# Patient Record
Sex: MALE | Race: WHITE | NOT HISPANIC OR LATINO | Employment: FULL TIME | URBAN - METROPOLITAN AREA
[De-identification: names, ages, dates, MRNs, and addresses within clinical notes are randomized per-mention and may not be internally consistent; named-entity substitution may affect disease eponyms.]

---

## 2017-03-21 ENCOUNTER — OFFICE VISIT - HEALTHEAST (OUTPATIENT)
Dept: FAMILY MEDICINE | Facility: CLINIC | Age: 37
End: 2017-03-21

## 2017-03-21 DIAGNOSIS — B35.1 ONYCHOMYCOSIS: ICD-10-CM

## 2017-03-21 ASSESSMENT — MIFFLIN-ST. JEOR: SCORE: 2024.83

## 2017-04-17 ENCOUNTER — OFFICE VISIT - HEALTHEAST (OUTPATIENT)
Dept: FAMILY MEDICINE | Facility: CLINIC | Age: 37
End: 2017-04-17

## 2017-04-17 DIAGNOSIS — R42 LIGHTHEADEDNESS: ICD-10-CM

## 2017-04-17 DIAGNOSIS — G56.20 ULNAR NEUROPATHY: ICD-10-CM

## 2017-04-17 DIAGNOSIS — M54.2 NECK PAIN: ICD-10-CM

## 2017-04-17 LAB — HBA1C MFR BLD: 5.7 % (ref 3.5–6)

## 2017-04-17 ASSESSMENT — MIFFLIN-ST. JEOR: SCORE: 2020.29

## 2017-04-25 ENCOUNTER — HOSPITAL ENCOUNTER (OUTPATIENT)
Dept: MRI IMAGING | Facility: HOSPITAL | Age: 37
Discharge: HOME | End: 2017-04-25
Attending: FAMILY MEDICINE

## 2017-04-25 DIAGNOSIS — G56.20 ULNAR NEUROPATHY: ICD-10-CM

## 2017-04-25 DIAGNOSIS — M54.2 NECK PAIN: ICD-10-CM

## 2017-05-01 ENCOUNTER — COMMUNICATION - HEALTHEAST (OUTPATIENT)
Dept: FAMILY MEDICINE | Facility: CLINIC | Age: 37
End: 2017-05-01

## 2017-05-01 DIAGNOSIS — M50.30 BULGE OF CERVICAL DISC WITHOUT MYELOPATHY: ICD-10-CM

## 2017-05-01 DIAGNOSIS — M54.2 NECK PAIN: ICD-10-CM

## 2017-05-08 ENCOUNTER — HOSPITAL ENCOUNTER (OUTPATIENT)
Dept: PHYSICAL MEDICINE AND REHAB | Facility: CLINIC | Age: 37
Discharge: HOME | End: 2017-05-08
Attending: FAMILY MEDICINE

## 2017-05-08 DIAGNOSIS — G56.20 ULNAR NEUROPATHY: ICD-10-CM

## 2017-05-08 DIAGNOSIS — M54.12 CERVICAL RADICULITIS: ICD-10-CM

## 2017-05-08 DIAGNOSIS — M50.20 CERVICAL DISC HERNIATION: ICD-10-CM

## 2017-05-12 ENCOUNTER — OFFICE VISIT - HEALTHEAST (OUTPATIENT)
Dept: PHYSICAL THERAPY | Facility: CLINIC | Age: 37
End: 2017-05-12

## 2017-05-12 DIAGNOSIS — M54.2 NECK PAIN: ICD-10-CM

## 2017-05-12 DIAGNOSIS — G56.21 ULNAR NEUROPATHY OF RIGHT UPPER EXTREMITY: ICD-10-CM

## 2017-05-23 ENCOUNTER — OFFICE VISIT - HEALTHEAST (OUTPATIENT)
Dept: PHYSICAL THERAPY | Facility: CLINIC | Age: 37
End: 2017-05-23

## 2017-05-23 DIAGNOSIS — G56.21 ULNAR NEUROPATHY OF RIGHT UPPER EXTREMITY: ICD-10-CM

## 2017-05-23 DIAGNOSIS — M54.2 NECK PAIN: ICD-10-CM

## 2017-06-05 ENCOUNTER — HOSPITAL ENCOUNTER (OUTPATIENT)
Dept: PHYSICAL MEDICINE AND REHAB | Facility: CLINIC | Age: 37
Discharge: HOME | End: 2017-06-05
Attending: PHYSICIAN ASSISTANT

## 2017-06-05 DIAGNOSIS — G56.20 ULNAR NEUROPATHY: ICD-10-CM

## 2017-06-05 DIAGNOSIS — M50.20 CERVICAL DISC HERNIATION: ICD-10-CM

## 2017-06-05 DIAGNOSIS — M54.12 CERVICAL RADICULITIS: ICD-10-CM

## 2018-02-12 ENCOUNTER — OFFICE VISIT - HEALTHEAST (OUTPATIENT)
Dept: FAMILY MEDICINE | Facility: CLINIC | Age: 38
End: 2018-02-12

## 2018-02-12 ENCOUNTER — RECORDS - HEALTHEAST (OUTPATIENT)
Dept: GENERAL RADIOLOGY | Facility: CLINIC | Age: 38
End: 2018-02-12

## 2018-02-12 DIAGNOSIS — M79.641 PAIN IN RIGHT HAND: ICD-10-CM

## 2018-02-12 DIAGNOSIS — M79.641 RIGHT HAND PAIN: ICD-10-CM

## 2018-02-19 ENCOUNTER — RECORDS - HEALTHEAST (OUTPATIENT)
Dept: ADMINISTRATIVE | Facility: OTHER | Age: 38
End: 2018-02-19

## 2018-02-26 ENCOUNTER — RECORDS - HEALTHEAST (OUTPATIENT)
Dept: ADMINISTRATIVE | Facility: OTHER | Age: 38
End: 2018-02-26

## 2018-03-27 ENCOUNTER — RECORDS - HEALTHEAST (OUTPATIENT)
Dept: ADMINISTRATIVE | Facility: OTHER | Age: 38
End: 2018-03-27

## 2018-05-30 ENCOUNTER — RECORDS - HEALTHEAST (OUTPATIENT)
Dept: ADMINISTRATIVE | Facility: OTHER | Age: 38
End: 2018-05-30

## 2018-09-28 ENCOUNTER — RECORDS - HEALTHEAST (OUTPATIENT)
Dept: ADMINISTRATIVE | Facility: OTHER | Age: 38
End: 2018-09-28

## 2019-02-08 ENCOUNTER — RECORDS - HEALTHEAST (OUTPATIENT)
Dept: ADMINISTRATIVE | Facility: OTHER | Age: 39
End: 2019-02-08

## 2019-02-11 ENCOUNTER — COMMUNICATION - HEALTHEAST (OUTPATIENT)
Dept: SCHEDULING | Facility: CLINIC | Age: 39
End: 2019-02-11

## 2019-08-12 ENCOUNTER — COMMUNICATION - HEALTHEAST (OUTPATIENT)
Dept: SCHEDULING | Facility: CLINIC | Age: 39
End: 2019-08-12

## 2019-09-18 ENCOUNTER — RECORDS - HEALTHEAST (OUTPATIENT)
Dept: ADMINISTRATIVE | Facility: OTHER | Age: 39
End: 2019-09-18

## 2020-02-28 ENCOUNTER — COMMUNICATION - HEALTHEAST (OUTPATIENT)
Dept: FAMILY MEDICINE | Facility: CLINIC | Age: 40
End: 2020-02-28

## 2020-08-10 ENCOUNTER — OFFICE VISIT - HEALTHEAST (OUTPATIENT)
Dept: FAMILY MEDICINE | Facility: CLINIC | Age: 40
End: 2020-08-10

## 2020-08-10 ENCOUNTER — RECORDS - HEALTHEAST (OUTPATIENT)
Dept: GENERAL RADIOLOGY | Facility: CLINIC | Age: 40
End: 2020-08-10

## 2020-08-10 DIAGNOSIS — M70.22 OLECRANON BURSITIS, LEFT ELBOW: ICD-10-CM

## 2020-08-10 DIAGNOSIS — M70.22 OLECRANON BURSITIS OF LEFT ELBOW: ICD-10-CM

## 2020-08-10 ASSESSMENT — MIFFLIN-ST. JEOR: SCORE: 2083.9

## 2020-11-17 ENCOUNTER — RECORDS - HEALTHEAST (OUTPATIENT)
Dept: ADMINISTRATIVE | Facility: OTHER | Age: 40
End: 2020-11-17

## 2021-05-03 ENCOUNTER — OFFICE VISIT - HEALTHEAST (OUTPATIENT)
Dept: FAMILY MEDICINE | Facility: CLINIC | Age: 41
End: 2021-05-03

## 2021-05-03 DIAGNOSIS — M25.531 RIGHT WRIST PAIN: ICD-10-CM

## 2021-05-03 DIAGNOSIS — R20.2 BILATERAL ARM NUMBNESS AND TINGLING WHILE SLEEPING: ICD-10-CM

## 2021-05-03 DIAGNOSIS — R20.0 BILATERAL ARM NUMBNESS AND TINGLING WHILE SLEEPING: ICD-10-CM

## 2021-05-03 DIAGNOSIS — D17.30 LIPOMA OF SKIN AND SUBCUTANEOUS TISSUE: ICD-10-CM

## 2021-05-12 ENCOUNTER — RECORDS - HEALTHEAST (OUTPATIENT)
Dept: ADMINISTRATIVE | Facility: OTHER | Age: 41
End: 2021-05-12

## 2021-05-17 ENCOUNTER — OFFICE VISIT - HEALTHEAST (OUTPATIENT)
Dept: FAMILY MEDICINE | Facility: CLINIC | Age: 41
End: 2021-05-17

## 2021-05-17 DIAGNOSIS — Z00.00 ROUTINE GENERAL MEDICAL EXAMINATION AT A HEALTH CARE FACILITY: ICD-10-CM

## 2021-05-17 DIAGNOSIS — R20.0 BILATERAL HAND NUMBNESS: ICD-10-CM

## 2021-05-17 DIAGNOSIS — R79.89 LOW VITAMIN D LEVEL: ICD-10-CM

## 2021-05-17 DIAGNOSIS — M25.531 RIGHT WRIST PAIN: ICD-10-CM

## 2021-05-17 DIAGNOSIS — M50.30 BULGE OF CERVICAL DISC WITHOUT MYELOPATHY: ICD-10-CM

## 2021-05-17 RX ORDER — SACCHAROMYCES BOULARDII 250 MG
250 CAPSULE ORAL 2 TIMES DAILY
Status: SHIPPED | COMMUNITY
Start: 2021-05-17 | End: 2022-02-07

## 2021-05-17 ASSESSMENT — MIFFLIN-ST. JEOR: SCORE: 2094.68

## 2021-05-21 ENCOUNTER — AMBULATORY - HEALTHEAST (OUTPATIENT)
Dept: LAB | Facility: CLINIC | Age: 41
End: 2021-05-21

## 2021-05-21 DIAGNOSIS — Z00.00 ROUTINE GENERAL MEDICAL EXAMINATION AT A HEALTH CARE FACILITY: ICD-10-CM

## 2021-05-21 LAB
ANION GAP SERPL CALCULATED.3IONS-SCNC: 9 MMOL/L (ref 5–18)
BUN SERPL-MCNC: 16 MG/DL (ref 8–22)
CALCIUM SERPL-MCNC: 9.3 MG/DL (ref 8.5–10.5)
CHLORIDE BLD-SCNC: 104 MMOL/L (ref 98–107)
CHOLEST SERPL-MCNC: 193 MG/DL
CO2 SERPL-SCNC: 25 MMOL/L (ref 22–31)
CREAT SERPL-MCNC: 0.96 MG/DL (ref 0.7–1.3)
ERYTHROCYTE [DISTWIDTH] IN BLOOD BY AUTOMATED COUNT: 11.9 % (ref 11–14.5)
FASTING STATUS PATIENT QL REPORTED: YES
GFR SERPL CREATININE-BSD FRML MDRD: >60 ML/MIN/1.73M2
GLUCOSE BLD-MCNC: 102 MG/DL (ref 70–125)
HCT VFR BLD AUTO: 44 % (ref 40–54)
HDLC SERPL-MCNC: 49 MG/DL
HGB BLD-MCNC: 14.5 G/DL (ref 14–18)
LDLC SERPL CALC-MCNC: 128 MG/DL
MCH RBC QN AUTO: 28.9 PG (ref 27–34)
MCHC RBC AUTO-ENTMCNC: 33 G/DL (ref 32–36)
MCV RBC AUTO: 88 FL (ref 80–100)
PLATELET # BLD AUTO: 293 THOU/UL (ref 140–440)
PMV BLD AUTO: 10 FL (ref 7–10)
POTASSIUM BLD-SCNC: 4.2 MMOL/L (ref 3.5–5)
RBC # BLD AUTO: 5.01 MILL/UL (ref 4.4–6.2)
SODIUM SERPL-SCNC: 138 MMOL/L (ref 136–145)
TRIGL SERPL-MCNC: 78 MG/DL
TSH SERPL DL<=0.005 MIU/L-ACNC: 1.51 UIU/ML (ref 0.3–5)
WBC: 6.2 THOU/UL (ref 4–11)

## 2021-05-24 LAB
25(OH)D3 SERPL-MCNC: 20.8 NG/ML (ref 30–80)
25(OH)D3 SERPL-MCNC: 20.8 NG/ML (ref 30–80)

## 2021-05-27 ENCOUNTER — RECORDS - HEALTHEAST (OUTPATIENT)
Dept: ADMINISTRATIVE | Facility: CLINIC | Age: 41
End: 2021-05-27

## 2021-05-27 VITALS
TEMPERATURE: 97.9 F | HEIGHT: 77 IN | DIASTOLIC BLOOD PRESSURE: 87 MMHG | SYSTOLIC BLOOD PRESSURE: 134 MMHG | HEART RATE: 59 BPM | WEIGHT: 236.4 LBS | RESPIRATION RATE: 16 BRPM | BODY MASS INDEX: 27.91 KG/M2

## 2021-05-28 ENCOUNTER — HOSPITAL ENCOUNTER (OUTPATIENT)
Dept: MRI IMAGING | Facility: HOSPITAL | Age: 41
Discharge: HOME | End: 2021-05-28
Attending: FAMILY MEDICINE

## 2021-05-28 DIAGNOSIS — M25.531 RIGHT WRIST PAIN: ICD-10-CM

## 2021-05-28 DIAGNOSIS — M50.30 BULGE OF CERVICAL DISC WITHOUT MYELOPATHY: ICD-10-CM

## 2021-05-28 DIAGNOSIS — R20.0 BILATERAL HAND NUMBNESS: ICD-10-CM

## 2021-05-30 VITALS — BODY MASS INDEX: 26.21 KG/M2 | WEIGHT: 221 LBS

## 2021-05-30 VITALS — BODY MASS INDEX: 25.98 KG/M2 | WEIGHT: 220 LBS | HEIGHT: 77 IN

## 2021-05-30 VITALS — BODY MASS INDEX: 26.09 KG/M2 | WEIGHT: 221 LBS | HEIGHT: 77 IN

## 2021-05-31 VITALS — WEIGHT: 227.4 LBS | BODY MASS INDEX: 26.97 KG/M2

## 2021-05-31 NOTE — TELEPHONE ENCOUNTER
Triage call:     Over the Past week patient has noticed considerable discomfort in left leg. Also noted Numbness in left foot. Intermittent back pain but not present currently. No falls, but reports that he jumped and landed hard about 1.5 weeks ago- patient is unsure if that is related. Pain is in his hamstring and into back of calf- currently it feels sore. Comes and goes but has been present continuously for longer than one hour. No swelling noted today.    Triaged to be seen in the ER to R/O a DVT. Patient agrees to go into be seen. Will go to [facility] to be evaluated.     Rosalina Bradley RN BSBA [facility] Triage/Med Refill 8/12/2019 12:19 PM      Reason for Disposition    Thigh or calf pain in only one leg and present > 1 hour    Protocols used: LEG PAIN-A-OH

## 2021-06-01 ENCOUNTER — RECORDS - HEALTHEAST (OUTPATIENT)
Dept: ADMINISTRATIVE | Facility: CLINIC | Age: 41
End: 2021-06-01

## 2021-06-02 ENCOUNTER — RECORDS - HEALTHEAST (OUTPATIENT)
Dept: ADMINISTRATIVE | Facility: CLINIC | Age: 41
End: 2021-06-02

## 2021-06-04 VITALS
RESPIRATION RATE: 12 BRPM | BODY MASS INDEX: 27.76 KG/M2 | TEMPERATURE: 98.4 F | SYSTOLIC BLOOD PRESSURE: 130 MMHG | HEIGHT: 77 IN | WEIGHT: 235.13 LBS | HEART RATE: 67 BPM | DIASTOLIC BLOOD PRESSURE: 82 MMHG

## 2021-06-05 VITALS
TEMPERATURE: 97.1 F | WEIGHT: 237 LBS | HEART RATE: 64 BPM | SYSTOLIC BLOOD PRESSURE: 137 MMHG | DIASTOLIC BLOOD PRESSURE: 86 MMHG | RESPIRATION RATE: 16 BRPM | BODY MASS INDEX: 28.1 KG/M2

## 2021-06-09 NOTE — PROGRESS NOTES
SUBJECTIVE:    This is a 36-year-old male who presents the clinic as he has noted toenail changes. He has developed a discoloration involving the third toes bilaterally. There is some involvement of other toenails as well. He cannot think of a specific injury. He denies pain or redness.  There has not been civic and thickening. He wonders about options for treatment.      Patient Active Problem List   Diagnosis     Benign Skin Neoplasm     Hemangioma Of The Skin     Seborrheic Dermatitis     Skin Neoplasm Of Uncertain Behavior     Concentric Left Ventricular Hypertrophy       No current outpatient prescriptions on file prior to visit.     No current facility-administered medications on file prior to visit.        No Known Allergies         A 12 point comprehensive review of systems was negative except as noted.      OBJECTIVE:     Vitals:    03/21/17 1046   BP: 126/70   Pulse: 60   Resp: 16   Temp: 97.5  F (36.4  C)       General: Alert, not acutely distressed   Head:  Atraumatic, normocephalic  Extremities: Without cyanosis or edema   There is a whitish discoloration of his third toenails  There is some involvement of other toenails  Neuro:  CN II-XII appear intact        Laboratory Results:    Results for orders placed or performed in visit on 08/18/16   Comprehensive Metabolic Panel   Result Value Ref Range    Sodium 141 136 - 145 mmol/L    Potassium 4.3 3.5 - 5.0 mmol/L    Chloride 107 98 - 107 mmol/L    CO2 21 (L) 22 - 31 mmol/L    Anion Gap, Calculation 13 5 - 18 mmol/L    Glucose 98 70 - 125 mg/dL    BUN 15 8 - 22 mg/dL    Creatinine 1.04 0.70 - 1.30 mg/dL    GFR MDRD Af Amer >60 >60 mL/min/1.73m2    GFR MDRD Non Af Amer >60 >60 mL/min/1.73m2    Bilirubin, Total 0.4 0.0 - 1.0 mg/dL    Calcium 9.9 8.5 - 10.5 mg/dL    Protein, Total 7.3 6.0 - 8.0 g/dL    Albumin 4.5 3.5 - 5.0 g/dL    Alkaline Phosphatase 55 45 - 120 U/L    AST 19 0 - 40 U/L    ALT 12 0 - 45 U/L   Thyroid Cascade   Result Value Ref Range    TSH  1.01 0.30 - 5.00 uIU/mL   HM2(CBC w/o Differential)   Result Value Ref Range    WBC 5.4 4.0 - 11.0 thou/uL    RBC 4.86 4.40 - 6.20 mill/uL    Hemoglobin 14.9 14.0 - 18.0 g/dL    Hematocrit 43.6 40.0 - 54.0 %    MCV 90 80 - 100 fL    MCH 30.6 27.0 - 34.0 pg    MCHC 34.2 32.0 - 36.0 g/dL    RDW 11.3 11.0 - 14.5 %    Platelets 240 140 - 440 thou/uL    MPV 8.6 7.0 - 10.0 fL   Sedimentation Rate   Result Value Ref Range    Sed Rate 2 0 - 15 mm/hr   C-Reactive Protein(CRP)   Result Value Ref Range    CRP <0.1 0.0 - 0.8 mg/dL   Antinuclear Antibody (VELASQUEZ) Cascade   Result Value Ref Range    VELASQUEZ Screen Cascade 1.1 <=2.9 U   Factor V Leiden   Result Value Ref Range    Factor V Leiden       This individual is normal (homozygous wild type) for the    Factor V Leiden [id number] mutation.   *Thrombophilia risk assessment: Not increased.  *Consider: Factor V Leiden and the Prothrombin  mutations together account for between 85 and 96 percent   of the causes of inherited thrombophilia. The College of  American Pathology Consensus Statement on  Thrombophilia 2002 states that when considering testing for  Factor V Leiden, the Prothrombin mutation should also be  tested. Testing for Prothrombin L66400R and MTHFR C677T is   also available. Genetic counseling regarding this result is available.  For more details, see separate final report      Thrombophilia Risk Assessment Not Increased Not Increased    Scan Result See Scanned Report          ASSESSMENT AND PLAN:    1. Onychomycosis    Reviewed treatment options  Reviewed risks of benefits of oral anti-fungal medication  Treat with Penlac topically  Follow-up for re-evaluation  Consider other treatment options if not improving    2. Heatlh Care Maintenance    He was given the tetanus with pertussis and hepatitis A vaccines        Saul Dywer MD      This note has been dictated and transcribed using voice recognition software.  Any grammatical or contextual distortions are  unintentional and inherent to the software.

## 2021-06-10 NOTE — PROGRESS NOTES
[facility] Daily Progress Note  Patient Name: Adam D Macewen  Date of evaluation: 5/12/2017  Today's Date: 5/23/2017  Visit Number: 2 of 20 (may transition to medx per order)  Referring provider: Alyssa Marcum PA-C  Referral Diagnosis: note on order: TENs/transition to medX if appropriate  Cervical disc herniation [M50.20]  - Primary       Cervical radiculitis [M54.12]       Ulnar neuropathy [G56.20]       Visit Diagnosis:     ICD-10-CM    1. Ulnar neuropathy of right upper extremity G56.21    2. Neck pain M54.2        Assessment:      Patient has had significant improvement in symptoms even prior to coming to PT for evaluation. He had modified his daily activities to improve how he feels. In PT, discussed that returning to running and hockey can be appropriate at this time and discussed in depth modifications to make to his biking routine. He also plays the guitar and noticed that this would cause numbness at night, so has modified this as well as adding the stretches/nerve glides to help. Also, discussed with his boss at work that getting a taller sit<>Stand desk is appropriate for him.   At this time, patient is going to slowly return to activites with modifications in mind and see how it goes. No further PT visits scheduled, but will keep chart open for now.     Patient's expectations/goals are: Realistic  Barriers to Learning or Achieving Goals: none    Goals:  Pt. will demonstrate/verbalize independence in self-management of condition: MET  Pt. will be independent with home exercise program: MET  Pt will: return to hockey without increased symptoms for healthy lifestyle: NOT ATTEMPTED YET  Pt will: wake <3x/wk d/t numbness in UEs: MET if modifying playing the guitar       Plan:      Hold chart open for 1 month, to f/u with referring provider to discuss status in 2 weeks     Subjective:       Symptoms (neck pain and arm N/T) are much improved with activity modification. Hasn't resumed running to his  normal intensity, or hockey/biking, but feels he is ready to do so. He has been playing the guitar/[employer] and this has increased his symptoms, so he has modified.      Objective:      Many cues required for imprint spine with abdominal exercises, but was able to perform by end of visit today. Discussed the following modifications:     Biking:   - Change hand positions frequently  - Wear gloves  - Think about getting bike fit again  - Core strengthening    For Numbness/tingling  - Continue with nerve glides/stretches  - Back off on length of time playing guitar and stretch intermittently    Add either bike or hockey first to see how you feel.     Treatment Today     TREATMENT MINUTES COMMENTS   Evaluation     Self-care/ Home management 25 See above for modifications discussed, also worked through ow to engage abdominals.    Manual therapy     Neuromuscular Re-education     Therapeutic Activity     Therapeutic Exercises     Gait training     Modality__________________                Total 25    Blank areas are intentional and mean the treatment did not include these items.          Salima Alicea, PT, DPT, CLT  5/23/2017  2:29 PM

## 2021-06-10 NOTE — PROGRESS NOTES
SUBJECTIVE:    This is a pleasant 36-year-old male who presents to the clinic as he has had recent history of neck pain.  He has had pain that radiates from the base of his neck to his upper back area.  He has had a sense of pressure at times.  More recently, he has developed numbness and tingling involving the right and left fourth and fifth fingers.  This seems to be worse at night when lying on his back.  He cannot think of a specific neck injury.  He denies weakness in his arms.  He does report that there are times where he will experience lightheadedness and may have blurred vision.  At other times he will get a sense of numbness or pressure that radiates to his jaw area from his neck.  He denies chest pain, palpitations, or lower extremity edema.  He has not experienced syncopal episodes.  He denies fever, chills, or recent illness.      Patient Active Problem List   Diagnosis     Benign Skin Neoplasm     Hemangioma Of The Skin     Seborrheic Dermatitis     Skin Neoplasm Of Uncertain Behavior     Concentric Left Ventricular Hypertrophy       Current Outpatient Prescriptions on File Prior to Visit   Medication Sig     ciclopirox (PENLAC) 8 % solution Apply over nail and surrounding skin. Apply daily over previous coat. After seven (7) days, may remove with alcohol and continue cycle.     No current facility-administered medications on file prior to visit.        No Known Allergies         A 12 point comprehensive review of systems was negative except as noted.      OBJECTIVE:     Vitals:    04/17/17 1044   BP: 132/80   Pulse: 60   Resp: 16   Temp: (!) 49.8  F (9.9  C)       General: Alert, not acutely distressed   Head:  Atraumatic, normocephalic  Eyes:  PERRL, extraocular movements are intact  Nose:  Septum midline  Throat:  Oral mucosa moist, oropharynx clear  Neck:  Supple without adenopathy or thyromegaly  There is some tenderness to palpation lateral to the cervical spine bilaterally and the paraspinous  muscles  Cardiovascular: S1-S2 with regular rate and without murmur, rub, or gallop   Lungs:  Clear to auscultation bilaterally   Extremities: Without cyanosis or edema   He has normal  strength bilaterally in both hands  Neuro:  CN II-XII appear intact  Gait is normal        Laboratory Results:    Results for orders placed or performed in visit on 04/17/17   Glycosylated Hemoglobin A1c   Result Value Ref Range    Hemoglobin A1c 5.7 3.5 - 6.0 %   HM2(CBC w/o Differential)   Result Value Ref Range    WBC 5.9 4.0 - 11.0 thou/uL    RBC 4.97 4.40 - 6.20 mill/uL    Hemoglobin 14.7 14.0 - 18.0 g/dL    Hematocrit 44.0 40.0 - 54.0 %    MCV 89 80 - 100 fL    MCH 29.6 27.0 - 34.0 pg    MCHC 33.4 32.0 - 36.0 g/dL    RDW 11.2 11.0 - 14.5 %    Platelets 226 140 - 440 thou/uL    MPV 8.6 7.0 - 10.0 fL         ASSESSMENT AND PLAN:    1. Neck pain  Possible cervical radiculopathy based on symptoms    X-rays of the cervical spine were personally reviewed by me.  No acute fracture is evident  There may be a loss of the natural curvature of the cervical spine.  We will await final radiology review  Given his symptoms refer him for an MRI of the cervical spine  - XR Cervical Spine 2 - 3 VWS  - MR Cervical Spine Without Contrast; Future  We will consider next steps based on the MRI results    2. Lightheadedness  Etiology unclear.  Consider that this could be related to cervical spine problem    Check laboratory testing as noted  - Basic Metabolic Panel  - Glycosylated Hemoglobin A1c  - Thyroid Cascade  - HM2(CBC w/o Differential)  - Vitamin D, Total (25-Hydroxy)  We will await the MRI results of the cervical spine  If having recurrent symptoms an MRI of the cervical spine is negative consider further evaluation with a Holter test and an echocardiogram    3. Ulnar neuropathy    We will await the results of the MRI of the cervical spine  Consider an elective myelogram of the upper extremities if necessary  - XR Cervical Spine 2 - 3  VWS  - MR Cervical Spine Without Contrast; Future        Saul Dwyer MD      This note has been dictated and transcribed using voice recognition software.  Any grammatical or contextual distortions are unintentional and inherent to the software.

## 2021-06-10 NOTE — PROGRESS NOTES
[facility]   Cervical Initial Evaluation  Patient Name: Adam D Macewen  Date of evaluation: 5/12/2017  Today's Date: 5/12/2017  Visit Number: 1 of 20 (may transition to medx per order)  Referring provider: Alyssa Marcum PA-C  Referral Diagnosis: note on order: TENs/transition to medX if appropriate  Cervical disc herniation [M50.20]  - Primary       Cervical radiculitis [M54.12]       Ulnar neuropathy [G56.20]       Visit Diagnosis:     ICD-10-CM    1. Ulnar neuropathy of right upper extremity G56.21    2. Neck pain M54.2        Assessment:        Garrett is a 36 y.o. male who presents to physical therapy today with complaints of neck pain and UE N/T in 4th and 5th digits that began to worsen about 1-2 months ago, but states he had neck pain that started about 2 years ago, improved with discontinuing body pump type workouts. Clinical exam today reveals CROM WNL, + ULLT for ulnar nerve and median nerve on right UE, no change in right UE symptoms with neck position changes, negative Alvarado's. Patient's symptoms are exacerbated by riding his bike and will wake up with numb hand. Patient would benefit from further PT in order to improve function.     Patient's expectations/goals are: Realistic  Barriers to Learning or Achieving Goals: none    Goals:  Pt. will demonstrate/verbalize independence in self-management of condition in : 6 weeks  Pt. will be independent with home exercise program in : 6 weeks  Pt will: return to hockey without increased symtpoms in 6 weeks for healthy lifestyle  Pt will: wake <3x/wk d/t numbness in UEs in 6 weeks       Plan:        Plan for next visit: patient to work on HEP and trial a light walk/jog over the next 2 weeks, then will return to PT to assess how symptoms are. To continue to hold on biking and hockey for now.     Plan of Care:   Authorization / Certification Start Date: 05/12/17  Authorization / Certification End Date: 08/11/17  Authorization / Certification Number of  Visits: 20  Communication with: Referral Source  Patient Related Instruction: Nature of Condition;Treatment plan and rationale;Self Care instruction;Basis of treatment;Body mechanics;Posture  Discharge Planning: to self care/ HEP  Therapeutic Exercise: ROM;Stretching;Strengthening  Neuromuscular Reeducation: posture;TNE;core;kinesio tape  Manual Therapy: soft tissue mobilization;myofascial release;joint mobilization;muscle energy  Modalities: electrical stimulation;TENS;traction  Equipment: TENS unit       Subjective:         History of Present Illness:    Garrett is a 36 y.o. male who presents to physical therapy today with complaints of neck pain and bilat UE N/T that began about 2 year ago without known trauma, he stopped doing workout classes and that helped a little and recently worsened about 2 months ago. Pt reports symptoms are better with resting from activities and worse with activities ie: running/biking/hockey. Hasn't done these for a month now and is feeling better. Uses a road bike and has excessive neck extension.   Patient denies urinary or bowel incontinence, unintentional weight loss, saddle anesthesia, fever or chills, balance difficulties, N/T in legs    Pertinent PMH: Healthy  Social information: sits for work, .    Pain rating today:0/10 states he's fine and symptoms started improving significantly the past week since he hasn't done any hockey, running or biking in 1 month    Functional limitations:  Exercise routine       Objective:      Note: Items left blank indicates the item was not performed or not indicated at the time of the evaluation.    Cervical Thoracic Examination  1. Ulnar neuropathy of right upper extremity     2. Neck pain         Precautions/Restrictions: None    Involved side: primarily right UE is numb when sleeping, midline neck pain    Posture Observation: fair, slightly FHP    Flexibility: decreased flexibility and myofascial mobility in bilat UEs paloma in  forearms    Palpation:not tender in cervical musculature    Passive Mobility-Joint Integrity:NT today    UE Screen: full shoulder ROM with flexion    Cervical ROM:    Flexion: WNL  Extension: WNL  Sidebendin bilat  Rotation: Mild restriction       Upper Extremity Strength (/5)  Shoulder Flexion: 5 bilat  Shoulder Abduction (C5): 5 bilat  Elbow Flexion (C6): 5 bilat  Elbow Extension (C7): 5 bilat  Wrist Flexion (C7): 5 bilat  Wrist Extension (C6) : 5 bilat  Thumb abduction (C8): 5 bilat  Finger Abduction (T1): 5 bilat      Cervical Sensation Testing: WNL bilat to light touch    Reflex Testing:   Arsen s test: negative    Cervical Special Tests:   Cervical distraction: negative  Shoulder abduction sign: negative  Sharper-tory: negative  ULTT 1 median nerve: + right  ULTT Ulnar nerve: + right  ULTT Radial nerve: + right  Tinels: + on R ulnar nerve in cubital tunnel, - on left    Treatment Today     TREATMENT MINUTES COMMENTS   Evaluation 25 Neck, low complexity   Self-care/ Home management 30 Ed on HEP to start (see flowsheet) discussed activity modification, start a gentle jog/walk, hold on biking and hockey for now, discussed moving every 30 minutes at work, discussed sleeping positions ie: on sides, arm straight vs. Bent   Manual therapy     Neuromuscular Re-education     Therapeutic Activity     Therapeutic Exercises     Gait training     Modality__________________                Total 55    Blank areas are intentional and mean the treatment did not include these items.     PT Evaluation Code: (Please list factors)  Patient History/Comorbidities: none  Examination: + ULTT on right  Clinical Presentation: stable  Clinical Decision Making: low    Patient History/  Comorbidities Examination  (body structures and functions, activity limitations, and/or participation restrictions) Clinical Presentation Clinical Decision Making (Complexity)   No documented Comorbidities or personal factors 1-2 Elements Stable  and/or uncomplicated Low   1-2 documented comorbidities or personal factor 3 Elements Evolving clinical presentation with changing characteristics Moderate   3-4 documented comorbidities or personal factors 4 or more Unstable and unpredictable High                  Salima Alicea, PT, DPT, CLT  5/12/2017  2:29 PM

## 2021-06-10 NOTE — PROGRESS NOTES
ASSESSMENT: Adam D Macewen is a 36 y.o. male who is otherwise healthy who presents today for new patient evaluation of chronic bilateral neck pain and bilateral upper extremity paresthesias.  The patient has 2 separate issues.  An MRI of the cervical spine did show a disc bulge and focal protrusion at C5-6 which results in moderate right foraminal stenosis.  This is the source of his neck and upper back pain.  His bilateral upper extremity paresthesias are likely due to bilateral ulnar neuropathy at the elbow.  The paresthesias affect the fourth and fifth digits of the hand.  He has no C8 nerve root compromise.    NDI:  26  WHO 5: 16    PLAN:  A shared decision making model was used.  The patient's values and choices were respected.  The following represents what was discussed and decided upon by the physician assistant and the patient.      1.  DIAGNOSTIC TESTS:  I reviewed the MRI cervical spine with the patient.  We could consider an EMG of the bilateral upper extremities.    2.  PHYSICAL THERAPY:  Discussed the importance of core strengthening, ROM, stretching exercises with the patient and how each of these entities is important in decreasing pain.  Explained to the patient that the purpose of physical therapy is to teach the patient a home exercise program.  These exercises need to be performed every day in order to decrease pain and prevent future occurrences of pain.   I placed an order for the patient begin physical therapy at the [place] location of [facility] rehab.  He could be transitioned to the medics program.    3.  MEDICATIONS: No changes are made to the patient's medications.  He will continue using ibuprofen as needed.  I did offer for the patient try gabapentin.  He declined.    4.  INTERVENTIONS: No interventions were ordered.  The patient's pain were to worsen, we could consider interventional pain management.    5.  PATIENT EDUCATION: I recommended that the patient try to avoid putting pressure  on his elbows.  He primarily gets the numbness and tingling in his arms when he sleeps on his back at night.  I recommended that he try to sleep on his sides if possible.    -The patient was in agreement the above plan.  All questions were answered.    6.  FOLLOW-UP:   I will see the patient back in clinic in about 4 weeks to follow-up.  If he has any questions or concerns in the meantime, he should not hesitate to contact our clinic.        SUBJECTIVE:  Adam D Macewen  Is a 36 y.o. male who presents today in consultation at the request of Dr. Ivey for new patient evaluation of neck pain and bilateral upper extremity paresthesias.  The patient states that he has had neck pain for approximately the past 2 years.  It has become more severe over the past 2-3 months.  The patient denies any specific injury or event to cause the pain or to cause the exacerbation of his pain.    The patient complains of bilateral posterior neck pain.  The pain extends into the upper trapezius muscles bilaterally.  It does not radiate further down the arms.  He describes the pain as a dull pain.  It is slightly worse on the left than the right.  The patient's pain is aggravated with running, biking, and lifting weights.  He is actually quit biking and running because of his pain and he would like to be able to participate in those activities again.  His pain is alleviated with applying heat.  The patient states that when his pain is severe he has difficulty concentrating at work and he begins to feel lightheaded.  He denies any headache.  He denies any weakness in his arms.  The patient does experience numbness and tingling in both arms.  This is worse on the right than the left.  The numbness and tingling extends from the ulnar forearm into the fourth and fifth digits bilaterally.  This only occurs at night.  It occurs when he lays on his back to sleep.  The numbness and tingling wake him up.  It does not occur during the day.    The  patient has never had any treatment for his neck pain.  He has never had physical therapy or chiropractic treatment.  He does not have any history of spine surgery or spine injections.  He uses ibuprofen as needed for pain.    Current Outpatient Prescriptions on File Prior to Encounter   Medication Sig Dispense Refill     ciclopirox (PENLAC) 8 % solution Apply over nail and surrounding skin. Apply daily over previous coat. After seven (7) days, may remove with alcohol and continue cycle. 6.6 mL 1     Ibuprofen as needed    No Known Allergies      Patient Active Problem List   Diagnosis     Benign Skin Neoplasm     Hemangioma Of The Skin     Seborrheic Dermatitis     Skin Neoplasm Of Uncertain Behavior     Concentric Left Ventricular Hypertrophy     Bulge of cervical disc without myelopathy     Past surgical history: [place] teeth removal    Family History   Problem Relation Age of Onset     No Medical Problems Mother      No Medical Problems Father      Heart disease Maternal Grandfather      Social history: The patient is .  He has 3 children age 9, 7, and 4.  The patient works as a  for [email or web address]  He denies tobacco use.  She drinks alcohol socially.  He denies illicit drug use.    ROS: Positive for dizziness, change in bowel movements, diarrhea, poor sleep quality, indigestion, back pain.  Specifically negative for dysphagia, imbalance, fine motor skill difficulties, bowel/bladder dysfunction, fevers,chills, appetite changes, unexplained weight loss.   Otherwise 13 systems reviewed are negative.  Please see the patient's intake questionnaire from today for details.      OBJECTIVE:  PHYSICAL EXAMINATION:  CONSTITUTIONAL:  Vital signs as above.  No acute distress.  The patient is well nourished and well groomed.  PSYCHIATRIC:  The patient is awake, alert, oriented to person, place, time and answering questions appropriately with clear speech.    HEENT:  Normocephalic, atraumatic.   Sclera clear.    SKIN:  Skin over the face, bilateral upper extremities, and neck is clean, dry, intact without rashes.  LYMPH NODES:  No palpable or tender anterior/posterior cervical, submandibular, or supraclavicular lymph nodes.    MUSCLE STRENGTH:  5/5 strength for the bilateral shoulder abductors, elbow flexors/extensors, wrist extensors, finger flexors/abductors.  NEURO:  CN III-XII are grossly intact.  2/4 symmetric biceps, brachioradialis, triceps reflexes bilaterally.  Sensation to light touch is intact over bilateral upper extremities throughout.  Negative Alvarado's bilaterally.  Tinel sign is negative at the elbows bilaterally.  VASCULAR:  2/4 radial pulses bilaterally.  Warm upper limbs bilaterally.  Capillary refill in the upper extremities is less than 1 second.  MUSCULOSKELETAL: Cervical range of motion is full.  The patient did have some hypertonicity over the right cervical paraspinous muscles in the right upper trapezius muscles.    RESULTS:  MRI cervical spine from [facility] dated 4/25/17 was reviewed.  At C5-6 there is a posterior annular bulge and focal disc protrusion at the origin the right neural foramen which results in moderate right foraminal stenosis.  There is also mild bilateral facet arthropathy at this level.  At C4-5 there is a minimal posterior annular bulge.  There is no additional neural impingement.  Please see report for further details.

## 2021-06-10 NOTE — PROGRESS NOTES
"Assessment/Plan:    Adam D Macewen is a 40 y.o. male presenting for:    1. Olecranon bursitis of left elbow  X-ray is negative other than some soft tissue thickening consistent with olecranon bursitis.  I discussed this diagnosis with the patient.  He can certainly wrap his elbow if he would like given that there is still minimal swelling.  Otherwise some mitigation factors were discussed.  If this is something that continues to happen we could certainly have him see orthopedics.    Otherwise, follow-up as needed.  - XR Elbow Left 3 or More VWS; Future        There are no discontinued medications.        Chief Complaint:  Chief Complaint   Patient presents with     Elbow Injury     Thought maybe he strained a tendon from working out- had a golf ball size lump there for about 2-3wks- swelling has gone down but still feels a slight mass there       Subjective:   Adam D Macewen is a pleasant 40-year-old gentleman presenting to the clinic today with concerns over swelling in his left elbow.  He states that he believes he strained it while he was working out a few weeks ago.  It was fairly swollen (golf ball size) for a week or so although it was not overly painful.  This has slowly gone down but he feels as though there might be a small \"bone spur\" or \"bone chip\" that could have been irritating things.    Really no specific trauma such as hitting his elbow or falling.    12 point review of systems completed and negative except for what has been described above    Social History     Tobacco Use   Smoking Status Never Smoker   Smokeless Tobacco Never Used       No current outpatient medications on file.         Objective:  Vitals:    08/10/20 1629   BP: 130/82   Pulse: 67   Resp: 12   Temp: 98.4  F (36.9  C)   TempSrc: Oral   Weight: (!) 235 lb 2 oz (106.7 kg)   Height: 6' 5\" (1.956 m)       Body mass index is 27.88 kg/m .    Vital signs reviewed and stable  General: No acute distress  Psych: Appropriate affect  HEENT: " moist mucous membranes  Extremities: warm and well perfused with no edema  Skin: warm and dry with no rash  Focused musculoskeletal examination: Left elbow is very slightly swollen but nontender or warm.  No redness.       This note has been dictated and transcribed using voice recognition software.   Any errors in transcription are unintentional and inherent to the software.

## 2021-06-11 NOTE — PROGRESS NOTES
Assessment:   Adam D Macewen is a 37 y.o. y.o. male who is otherwise healthy who presents today for follow-up regarding bilateral neck pain and right greater than left upper extremity paresthesias.  The patient is 2 separate issues.  An MRI of the cervical spine showed a disc bulge and focal protrusion at C5-6 which results in moderate right foraminal stenosis which was likely the source of his neck and upper back pain.  The right upper extremity paresthesias affecting the fourth and fifth digits of the right hand is likely due to ulnar neuritis.  He has no C8 nerve root compromise.  The patient has had complete resolution of his pain over the past 4 weeks which he attributes to discontinuing running and biking.  The patient has also noticed that he only experiences the numbness and tingling after he plays the ukulele, which puts pressure on the right ulnar forearm.  He has stopped playing the ukulele and has had almost complete resolution of his paresthesias.       Plan:     A shared decision making plan was used.  The patient's values and choices were respected.  The following represents what was discussed and decided upon by the physician assistant and the patient.      1.  DIAGNOSTIC TESTS: I reviewed the MRI of the cervical spine.  No further diagnostic tests were ordered.  If the patient's paresthesias were to worsen, we could consider an EMG.    2.  PHYSICAL THERAPY: The patient attended 2 sessions of physical therapy and was discharged home with a home exercise program as his symptoms had improved significantly.  I encouraged the patient to do his home exercises on a daily basis.    3.  MEDICATIONS: No changes are made to the patient's medications.  The patient is not currently taking any pain relieving medications.    4.  INTERVENTIONS: No interventions were ordered.    5.  PATIENT EDUCATION: The patient discussed return to running and biking.  I recommended that he start with biking and start with short  slow rides.  As he tolerates that, he can slowly increase his distance and speed.  I recommended that he avoid prolonged extension of the neck as much as possible while he is on the road bike.  He may then try getting back into running.  The patient has found that swimming does not cause any pain, and I encouraged him to continue swimming.    6.  FOLLOW-UP: The patient can follow-up with me on an as-needed basis.  If he has any questions or concerns in the future, he should not hesitate to contact our clinic.    Subjective:     Adam D Macewen is a 37 y.o. male who presents today for follow-up regarding neck pain and right greater than left upper extremity paresthesias.  I saw the patient in consultation on May 8, 2017.  At that time I referred the patient to physical therapy.  The patient attended 2 sessions.  At that time his pain had already improved significantly, and he was discharged home with a home exercise program.  The patient states that over the past 4 weeks he has stopped running and biking and his pain is resolved completely.  He has also noticed that his numbness and tingling is aggravated with playing [employer].  The patient stopped playing the ukCoverMyMedsle and had a significant improvement in his paresthesias.    The patient denies any pain whatsoever today.  The patient states that he has mild intermittent numbness and tingling radiating down the right ulnar forearm and into the fourth and fifth digits of the right hand.  He rates his pain today is a 0-10.  He denies any weakness.    As mentioned above, the patient went to 2 sessions of physical therapy.  He is doing his home exercises.  He is not currently taking any medications for pain.    Past medical history is reviewed and is unchanged in the interim.    Family history is reviewed and is unchanged in the interim.    Review of Systems:  Positive for numbness/tingling.  Negative for loss of bowel/bladder control, footdrop, weakness, headache,  dizziness, nausea/vomiting, blurry vision, balance changes.     Objective:   CONSTITUTIONAL:  Vital signs as above.  No acute distress.  The patient is well nourished and well groomed.    PSYCHIATRIC:  The patient is awake, alert, oriented to person, place and time.  The patient is answering questions appropriately with clear speech.  Normal affect.  HEENT: Normocephalic, atraumatic.  Sclera clear.  Neck is supple.  SKIN:  Skin over the face, neck bilateral upper extremities is clean, dry, intact without rashes.  MUSCULOSKELETAL: The patient has 5/5 strength for the bilateral shoulder abductors, elbow flexors/extensors, wrist extensors, finger flexors/abductors.   NEUROLOGICAL:   Sensation to light touch is intact over bilateral upper extremities throughout.    RESULTS:  MRI cervical spine from [facility] dated 4/25/17 was reviewed.  At C5-6 there is a posterior annular bulge and focal disc protrusion at the origin the right neural foramen which results in moderate right foraminal stenosis.  There is also mild bilateral facet arthropathy at this level.  At C4-5 there is a minimal posterior annular bulge.  There is no additional neural impingement.  Please see report for further details.

## 2021-06-13 NOTE — PROGRESS NOTES
[facility] Discharge Summary    Patient Name: Adam D Macewen  Date: 9/19/2017  Referral Diagnosis: Cervical disc herneation  Referring provider: Alyssa Marcum    Patient was seen for 2 visits from 5/12/17 to 5/23/2017 without missed appointments.  See most recent PT note for updated status.   Goals were met except he hadn't tried hockey yet.     Physical Therapy will be discharged at this time.    Thank you for your referral.  Salima Alicea  9/19/2017  11:52 AM

## 2021-06-15 PROBLEM — M50.30 BULGE OF CERVICAL DISC WITHOUT MYELOPATHY: Status: ACTIVE | Noted: 2017-05-03

## 2021-06-16 PROBLEM — R79.89 LOW VITAMIN D LEVEL: Status: ACTIVE | Noted: 2017-10-07

## 2021-06-16 NOTE — PROGRESS NOTES
Assessment:      Fracture of 4th metacarpal shaft      Plan:      Natural history and expected course discussed. Questions answered.  Orthopedics referral.   Patient is referred to orthopedic clinic for further evaluation of the need for orthopedic surgeon and/or splinting/casting of the right hand.  Patient is in agreement to this plan and plans to go to or so [facility] immediately.    Subjective:      Adam D Macewen is a 37 y.o. male who presents for evaluation of right hand pain. Onset was sudden, related to recreational sports. Mechanism of injury: unknown.  Patient was playing broom ball on the ice with a hockey stick.  He did not notice in injury or trauma at that time.  However when he was done playing he noticed that his right hand was swollen and had some pain to it.  The injury occurred 2 weeks ago.  And he presents today because he still has pain and swelling in the right hand and difficulty lifting or extending his fourth and fifth finger.  The pain is mild, worsens with movement, and is relieved by rest. There is no associated numbness, tingling in 4th and 5th fingers. Evaluation to date: none. Treatment to date: OTC analgesics.    The following portions of the patient's history were reviewed and updated as appropriate: allergies, current medications, past family history, past medical history, past social history, past surgical history and problem list.    Review of Systems  Pertinent items are noted in HPI.      Objective:      /72 (Patient Site: Right Arm, Patient Position: Sitting, Cuff Size: Adult Large)  Pulse 60  Temp 98.2  F (36.8  C) (Oral)   Resp 16  Wt (!) 227 lb 6.4 oz (103.1 kg)  BMI 26.97 kg/m2  Right hand:  soft tissue tenderness and swelling at the 4th and 5th metacarpal, a depression of the 4th MCP joint, reduced range of motion of 4th and 5th fingers with extension, ecchymosis of 4th and 5th fingers, radial pulse normal, sensation normal and remainder of ipsilateral wrist, hand  and finger exam is normal   Left hand:  normal exam, no swelling, tenderness, instability; ligaments intact, full ROM both hands, wrists, and finger joints     Imaging:  X-ray right hand: fracture of Fourth metacarpal fracture of the shaft upon self review.      XR HAND RIGHT 3 OR MORE VWS  2/12/2018 12:05 PM     INDICATION: pain in 4th ad 5th MC, MCP, problems with extending 4th and 5th fingers  COMPARISON: None.     FINDINGS: Minimally displaced, comminuted fracture of the fourth metacarpal shaft. Remainder of the hand is intact.     NOTE: ABNORMAL REPORT     THE DICTATION ABOVE DESCRIBES AN ABNORMALITY FOR WHICH FOLLOW-UP IS NEEDED.

## 2021-06-17 NOTE — PATIENT INSTRUCTIONS - HE
We talked about the possibility of an ulnar neuropathy causing symptoms the near right fourth and fifth fingers  Also, we discussed median neuropathy which is carpal tunnel syndrome  I recommend follow-up with the orthopedic hand specialist  You may need a test called electromyelogram for further evaluation  I recommend wearing wrist splints  Depending on the results of your consultation you may need further imaging of your neck  You may also consider removal of some of the lipomas

## 2021-06-17 NOTE — PROGRESS NOTES
Assessment/ Plan     1. Right wrist pain    He may have wrist tendinitis  Consider carpal tunnel syndrome secondary to a median neuropathy though there is involvement of the right fourth and fifth fingers so consider an ulnar neuropathy as well    Discussed next steps of evaluation.  He will follow up with orthopedics  He may need electromyelogram for further evaluation  He will wear bilateral wrist splints at night initially  Recommend minimizing activities that may aggravate his discomfort  He may take anti-inflammatory medicine  Depending on the results of an electromyelogram he may need further evaluation of his cervical spine  He may need to follow-up with a specialist to see if his lipomas could be contributing to his symptoms  - Ambulatory referral to Orthopedics    2. Bilateral arm numbness and tingling while sleeping    As noted above he will follow up with orthopedic specialist may need electromyelogram  Consider further evaluation of the cervical spine  - Ambulatory referral to Orthopedics    3. Lipoma of skin and subcutaneous tissue    He has multiple lipomas of both upper extremities  Consider follow-up with the surgeon to discuss removal      Subjective:       Adam D Macewen is a 40 y.o. male who presents to the clinic primarily because of concern regarding pain, numbness, and tingling involving his right hand and wrist.  He states he has been more active recently and doing some yard work.  He was spreading fertilizer which required use of his right thumb.  He specifically has had some pain at the base of his right thumb and also in the area of the distal ulna.  He uses a mouse quite regularly for his job.  He has had some numbness and tingling that can radiate to the right thumb.  He has had some numbness and tingling involving the right fourth and fifth fingers as well.  Sometimes, he wakes up at night with numbness and tingling of both arms.  He has had this previously.  He reports that he can have  some neck and upper back stiffness.  Also, he reports that he did remove the armrests from his chair since he does tend to lean on those quite regularly.    Additionally, he has a history of multiple lipomas of both upper extremities.  He is worried that there could be some type of compression contributing to his symptoms.  He has had one lipoma removed previously.  Given that he developed a C. difficile infection following surgery he is nervous about considering another surgery.    The following portions of the patient's history were reviewed and updated as appropriate: allergies, current medications, past family history, past medical history, past social history, past surgical history and problem list. Medications have been reconciled    Review of Systems   A 12 point comprehensive review of systems was negative except as noted.      No current outpatient medications on file.     No current facility-administered medications for this visit.        Objective:      /86 (Patient Site: Left Arm, Patient Position: Sitting, Cuff Size: Adult Large)   Pulse 64   Temp 97.1  F (36.2  C) (Oral)   Resp 16   Wt (!) 237 lb (107.5 kg)   BMI 28.10 kg/m      General appearance: alert, appears stated age   Head: normocephalic, without obvious abnormality, atraumatic  Eyes: conjunctivae/corneas clear. PERRL, EOM's intact.   Lungs: clear to auscultation bilaterally  Heart: regular rate and rhythm, S1, S2 normal, no murmur, click, rub or gallop  Extremities: He has multiple palpable lipomas of both upper extremities  Phalen's test is negative  Percussion involving the right ulnar groove does produce an increase in symptoms involving the right fourth and fifth fingers  Finkelstein's test is negative  Skin: skin color, texture, turgor normal  Lymph nodes: Cervical nodes normal.  Neurologic: Alert and oriented X 3           No results found for this or any previous visit (from the past 168 hour(s)).       This note has been  dictated using voice recognition software. Any grammatical or context distortions are unintentional and inherent to the software    Saul Dwyer MD

## 2021-06-17 NOTE — PATIENT INSTRUCTIONS - HE
You may set up a fasting laboratory appointment at your convenience  We will check a cholesterol panel, metabolic panel with a blood sugar test, vitamin D level, thyroid test, and blood counts  You received the second hepatitis A vaccine  Continue to follow-up with the orthopedic hand specialist regarding your wrist symptoms  You do have a history of a cervical disc protrusion and may need another MRI for further evaluation  Increase physical activity.  Her goal is 30 minutes of aerobic exercise most days

## 2021-06-23 NOTE — TELEPHONE ENCOUNTER
RN Triage:    Spoke with 38 yr old Garrett who had appendectomy 3 nights ago 2/9/19 at [facility].    Pt c/o nausea and diarrhea.    Pt thinks he may have picked up a virus as his two boys have had diarrhea as well.    Nausea started yesterday and patient reports 5-6 episodes of water diarrhea today; not all large amounts.    Denies bloody or black tarry stools.    Pt denies fever.    Pt is drinking sips of fluids and has urinated within 12 hours.    Reports little appetite due to nausea.    Pt is taking Ibuprofen for pain control.  No concerns with level of pain.  Pt was told he could also take tylenol.    Scheduled for follow up with surgeon in 2 weeks.    Requesting something for nausea if possible.    PLAN:  Will consult with PCP now per protocol and patient request for Rx for nausea.  Please advise.  Advised to call surgeon's office as well to inform of symptoms.  Advised to try Tylenol for pain due to nausea.  Avis Szymanski RN   [facility] RN Triage        Reason for Disposition    [1] Caller has URGENT question AND [2] triager unable to answer question    Protocols used: POST-OP SYMPTOMS AND [id number]

## 2021-06-23 NOTE — TELEPHONE ENCOUNTER
I agree that this should all be managed by the surgeon's office given that he just had surgery.  They typically want to know what is happening with people and can best manage this situation.  It would not be ideal to prescribe an antinausea medication in case something is being masked.  Patient have a triage line that is available.  Otherwise, he should be seen.

## 2021-06-26 ENCOUNTER — HEALTH MAINTENANCE LETTER (OUTPATIENT)
Age: 41
End: 2021-06-26

## 2021-06-30 NOTE — PROGRESS NOTES
Progress Notes by Saul Dwyer MD at 5/17/2021  4:00 PM     Author: Saul Dwyer MD Service: -- Author Type: Physician    Filed: 5/18/2021  8:03 AM Encounter Date: 5/17/2021 Status: Signed    : Saul Dwyer MD (Physician)       MALE PREVENTATIVE EXAM    Assessment and Plan:     Patient has been advised of split billing requirements and indicates understanding: Yes    1. Routine general medical examination at a health care facility    Recommend increasing aerobic exercise.  His goal is 30 minutes of aerobic exercise most days  Check laboratory testing as noted    - Basic Metabolic Panel; Future  - HM2(CBC w/o Differential); Future  - Lipid Cascade; Future  - Thyroid Cascade; Future  - Vitamin D, Total (25-Hydroxy); Future    2. Right wrist pain  3. Bilateral hand numbness  Consider ulnar neuropathy bilaterally    He has followed up with orthopedics and will be having electromyelogram in the near future  He is wearing bilateral wrist splints  Continue to follow-up with orthopedics  He may benefit from an MRI of the cervical spine in the future, especially given his history of cervical disc protrusions    4. Bulge of cervical disc without myelopathy    Reviewed his previous abnormal cervical spine MRI showing disc protrusions at C4-C5 and C5-C6  He responded to conservative therapy previously with the spine care clinic  Consider referral back to spine care clinic as needed    5. Low vitamin D level    Check a vitamin D level  Recommend vitamin D supplementation        Next follow up:  No follow-ups on file.    Immunization Review  Adult Imm Review: No immunizations due today  No tobacco use    I discussed the following with the patient:   Adult Healthy Living: Importance of regular exercise  Healthy nutrition    I have had an Advance Directives discussion with the patient.    Subjective:   Chief Complaint: Adam D Macewen is an 40 y.o. male here for a preventative health  visit.    Patient has been advised of split billing requirements and indicates understanding: Yes  HPI: This is a pleasant 40-year-old male who presents to clinic for a complete physical examination.    His medical history is notable for multiple lipomas as well as a previous C. difficile colitis requiring treatment.    More recently, he was seen in the clinic because of pain, numbness, and tingling involving his wrists and hands.  He has had some discomfort and numbness and tingling involving the right fourth and fifth fingers.  His hands can fall asleep when he is sleeping.  He did follow-up with orthopedics and is wearing bilateral wrist splints and will have further evaluation with electromyelogram.  He has had a previous abnormal MRI of the cervical spine with a disc bulge noted at C4-C5 and it is protrusion at C5-C6.  He responded to conservative therapy at the time.    He otherwise is doing well.  He states he can consider getting more aerobic exercise.  His exercise consists of walking his dogs most days.  He is  and works as a  for [employer].  He has 1 or 2 alcoholic beverages per week.  Is not drink alcohol.    Family history is notable for a grandfather with heart disease.      Healthy Habits  Are you taking a daily aspirin? No  Do you typically exercising at least 40 min, 3-4 times per week?  NO  Do you usually eat at least 4 servings of fruit and vegetables a day, include whole grains and fiber and avoid regularly eating high fat foods? Yes  Have you had an eye exam in the past two years? Yes  Do you see a dentist twice per year? Yes  Do you have any concerns regarding sleep? YES    Safety Screen  If you own firearms, are they secured in a locked gun cabinet or with trigger locks? The patient does not own any firearms  Do you feel you are safe where you are living?: Yes (5/17/2021  4:14 PM)  Do you feel you are safe in your relationship(s)?: Yes (5/17/2021  4:14  "PM)      Review of Systems:  Please see above.  The rest of the review of systems are negative for all systems.     Cancer Screening     Patient has no health maintenance due at this time          Patient Care Team:  Saul Dwyer MD as PCP - Salima Campa MD as Assigned PCP        History     Reviewed By Date/Time Sections Reviewed    Maria M Gatica CMA 5/17/2021  4:11 PM Tobacco            Objective:   Vital Signs:   Visit Vitals  /87 (Patient Site: Left Arm, Patient Position: Sitting, Cuff Size: Adult Regular)   Pulse (!) 59   Temp 97.9  F (36.6  C) (Oral)   Resp 16   Ht 6' 5\" (1.956 m)   Wt (!) 236 lb 6.4 oz (107.2 kg)   BMI 28.03 kg/m           PHYSICAL EXAM  General appearance: alert, appears stated age and cooperative  Head: Normocephalic, without obvious abnormality, atraumatic  Eyes: conjunctivae/corneas clear. PERRL, EOM's intact.   Ears: normal TM's and external ear canals both ears  Nose: Nares normal. Septum midline. Mucosa normal.  Throat: lips, mucosa, and tongue normal; teeth and gums normal  Neck: no adenopathy, supple, symmetrical, trachea midline   Back: symmetric, no curvature. ROM normal.  Lungs: clear to auscultation bilaterally  Heart: regular rate and rhythm, S1, S2 normal, no murmur, click, rub or gallop  Abdomen: soft, non-tender  Genitourinary: Penis is circumcised, there are no scrotal masses, no inguinal hernia  Extremities: extremities normal, atraumatic, no cyanosis or edema  He is wearing bilateral wrist splints  Skin: Skin color, texture, turgor normal.  Lymph nodes: Cervical nodes normal.  Neurologic: Alert and oriented X 3.      The ASCVD Risk score (Shasta DC Jr., et al., 2013) failed to calculate for the following reasons:    Cannot find a previous HDL lab    Cannot find a previous total cholesterol lab         Medication List          Accurate as of May 17, 2021 11:59 PM. If you have any questions, ask your nurse or doctor.            CONTINUE " taking these medications    Saccharomyces boulardii 250 mg capsule  Also known as: FLORASTOR  INSTRUCTIONS: Take 250 mg by mouth 2 (two) times a day.               Additional Screenings Completed Today:

## 2021-07-12 ENCOUNTER — TRANSFERRED RECORDS (OUTPATIENT)
Dept: HEALTH INFORMATION MANAGEMENT | Facility: CLINIC | Age: 41
End: 2021-07-12

## 2021-10-16 ENCOUNTER — HEALTH MAINTENANCE LETTER (OUTPATIENT)
Age: 41
End: 2021-10-16

## 2022-02-07 ENCOUNTER — OFFICE VISIT (OUTPATIENT)
Dept: FAMILY MEDICINE | Facility: CLINIC | Age: 42
End: 2022-02-07
Payer: COMMERCIAL

## 2022-02-07 VITALS
DIASTOLIC BLOOD PRESSURE: 81 MMHG | WEIGHT: 239.4 LBS | SYSTOLIC BLOOD PRESSURE: 132 MMHG | RESPIRATION RATE: 16 BRPM | TEMPERATURE: 97.9 F | HEART RATE: 60 BPM | BODY MASS INDEX: 28.39 KG/M2

## 2022-02-07 DIAGNOSIS — L98.9 SKIN LESION: ICD-10-CM

## 2022-02-07 DIAGNOSIS — R22.41 LEG MASS, RIGHT: Primary | ICD-10-CM

## 2022-02-07 DIAGNOSIS — R79.89 LOW VITAMIN D LEVEL: ICD-10-CM

## 2022-02-07 PROCEDURE — 36415 COLL VENOUS BLD VENIPUNCTURE: CPT | Performed by: FAMILY MEDICINE

## 2022-02-07 PROCEDURE — 82306 VITAMIN D 25 HYDROXY: CPT | Performed by: FAMILY MEDICINE

## 2022-02-07 PROCEDURE — 99213 OFFICE O/P EST LOW 20 MIN: CPT | Performed by: FAMILY MEDICINE

## 2022-02-07 NOTE — PROGRESS NOTES
Assessment/ Plan     1. Leg mass, right    He has a proximal thigh mass.  This may represent a lipoma versus cyst versus other etiology    Refer for an ultrasound as the next step to evaluate for possible lipoma  Depending on findings consider additional imaging if warranted  Offered referral to surgery to discuss excision as well  We will start with the ultrasound then decide the next steps    - US Lower Extremity Non Vascular Right; Future    2. Skin lesion  He has a hyperpigmented patch in the right temple area   Recommend follow-up with dermatology for full skin check    - Adult Dermatology Referral; Future    3. Low vitamin D level    He has been taking vitamin D supplementation  Check vitamin D level    - Vitamin D Deficiency; Future  - Vitamin D Deficiency      Subjective:      Adam D Macewen is a 41 year old male who presents to the clinic to review a few issues.  First, he noticed a mass involving the right proximal thigh area.  He noticed this last week.  This is not painful.  There is no history of an injury.  He is prone to lipomas and has had surgical excision required in the past.  He denies obvious redness or pain in the area of the lump.    Also, he has noticed a pigmented area involving the right temple area.  This is new to him.  He has no personal history of skin cancer.      Finally, he does have a history of a low vitamin D level.  His vitamin D level was 20 in the past.  He has been taking vitamin D supplementation in the meantime.    The following portions of the patient's history were reviewed and updated as appropriate: allergies, current medications, past family history, past medical history, past social history, past surgical history and problem list. Medications have been reconciled    Review of Systems   A 12 point comprehensive review of systems was negative except as noted.      No current outpatient medications on file.       Objective:     /81 (BP Location: Left arm, Patient  Position: Sitting, Cuff Size: Adult Regular)   Pulse 60   Temp 97.9  F (36.6  C) (Oral)   Resp 16   Wt 108.6 kg (239 lb 6.4 oz)   BMI 28.39 kg/m      General appearance: alert, appears stated age   Head: normocephalic, without obvious abnormality, atraumatic  Eyes: conjunctivae/corneas clear. PERRL, EOM's intact.   Extremities: extremities normal, atraumatic, no cyanosis or edema   Examination of the right proximal thigh area reveals a soft, fluctuant mass medially  There is no overlying erythema  Skin: skin color, texture, turgor normal   He has a hyperpigmented patch involving the right temple area  Neurologic: Alert and oriented X 3           No results found for this or any previous visit (from the past 168 hour(s)).       This note has been dictated using voice recognition software. Any grammatical or context distortions are unintentional and inherent to the software    Saul Dwyer MD

## 2022-02-07 NOTE — PATIENT INSTRUCTIONS
Garrett,    Set up the ultrasound as we discussed  We may need to consider additional imaging  Follow-up with dermatology for full skin check.  You can have them take a look at the leg lump as well  We will check your vitamin D level    Saul Dwyer MD

## 2022-02-08 LAB — DEPRECATED CALCIDIOL+CALCIFEROL SERPL-MC: 30 UG/L (ref 30–80)

## 2022-02-11 ENCOUNTER — HOSPITAL ENCOUNTER (OUTPATIENT)
Dept: ULTRASOUND IMAGING | Facility: HOSPITAL | Age: 42
Discharge: HOME | End: 2022-02-11
Attending: FAMILY MEDICINE | Admitting: FAMILY MEDICINE
Payer: COMMERCIAL

## 2022-02-11 DIAGNOSIS — R22.41 LEG MASS, RIGHT: ICD-10-CM

## 2022-02-11 PROCEDURE — 76882 US LMTD JT/FCL EVL NVASC XTR: CPT | Mod: RT

## 2022-02-25 ENCOUNTER — NURSE TRIAGE (OUTPATIENT)
Dept: NURSING | Facility: CLINIC | Age: 42
End: 2022-02-25
Payer: COMMERCIAL

## 2022-02-25 NOTE — TELEPHONE ENCOUNTER
Patient has been experiencing soreness in groin between testicles and anus for the last week.     Sitting exacerbates it and he notices it more now after working at his desk all week.     Patient also noted that he found a lump in crease between groin and right thigh and was referred to get an ultrasound. Lump was diagnoses as a lipoma. That was around the same time that he started noticing the pain in his groin.    Per protocol, recommendations are for patient to See in Office within 3 days.Care advice given. Advised patient to call back if they develop any new or worsening symptoms. Patient verbalizes understanding and agrees with plan of care. Transferred patient to scheduling.    Kath Marti RN  02/25/22 12:46 PM  [facility] Nurse Advisor    Reason for Disposition    Brief pain in scrotum or testicle and present < 1 hour and recurrent (NO swelling)    Additional Information    Negative: Rash or color change of scrotum BUT no swelling or pain    Negative: Followed a genital injury (e.g., penis, scrotum)    Negative: Swelling of scrotum is main symptom    Negative: SEVERE pain (e.g., excruciating)    Negative: Swollen scrotum    Negative: Constant pain in scrotum or testicle and present > 1 hour    Negative: Vomiting    Negative: Fever > 100.4 F (38.0 C)    Negative: Patient sounds very sick or weak to the triager    Negative: Scrotum looks infected (e.g., draining sore, ulcer, red rash)    Negative: Blood in urine (red, pink, or tea-colored)    Negative: Pain comes and goes (intermittent) and present > 24 hours    Negative: Patient wants to be seen    Protocols used: SCROTAL PAIN-A-OH

## 2022-02-28 ENCOUNTER — OFFICE VISIT (OUTPATIENT)
Dept: FAMILY MEDICINE | Facility: CLINIC | Age: 42
End: 2022-02-28
Payer: COMMERCIAL

## 2022-02-28 VITALS
RESPIRATION RATE: 16 BRPM | HEIGHT: 77 IN | SYSTOLIC BLOOD PRESSURE: 140 MMHG | BODY MASS INDEX: 28.62 KG/M2 | WEIGHT: 242.4 LBS | DIASTOLIC BLOOD PRESSURE: 71 MMHG | HEART RATE: 65 BPM

## 2022-02-28 DIAGNOSIS — R10.2 PERINEAL PAIN: Primary | ICD-10-CM

## 2022-02-28 LAB
ALBUMIN UR-MCNC: NEGATIVE MG/DL
APPEARANCE UR: CLEAR
BILIRUB UR QL STRIP: NEGATIVE
C REACTIVE PROTEIN LHE: <0.1 MG/DL (ref 0–0.8)
COLOR UR AUTO: YELLOW
ERYTHROCYTE [DISTWIDTH] IN BLOOD BY AUTOMATED COUNT: 11.8 % (ref 10–15)
ERYTHROCYTE [SEDIMENTATION RATE] IN BLOOD BY WESTERGREN METHOD: 4 MM/HR (ref 0–15)
GLUCOSE UR STRIP-MCNC: NEGATIVE MG/DL
HCT VFR BLD AUTO: 42.6 % (ref 40–53)
HGB BLD-MCNC: 14 G/DL (ref 13.3–17.7)
HGB UR QL STRIP: NEGATIVE
KETONES UR STRIP-MCNC: NEGATIVE MG/DL
LEUKOCYTE ESTERASE UR QL STRIP: NEGATIVE
MCH RBC QN AUTO: 29.3 PG (ref 26.5–33)
MCHC RBC AUTO-ENTMCNC: 32.9 G/DL (ref 31.5–36.5)
MCV RBC AUTO: 89 FL (ref 78–100)
NITRATE UR QL: NEGATIVE
PH UR STRIP: 5.5 [PH] (ref 5–8)
PLATELET # BLD AUTO: 272 10E3/UL (ref 150–450)
RBC # BLD AUTO: 4.78 10E6/UL (ref 4.4–5.9)
SP GR UR STRIP: 1.01 (ref 1–1.03)
UROBILINOGEN UR STRIP-ACNC: 0.2 E.U./DL
WBC # BLD AUTO: 5.4 10E3/UL (ref 4–11)

## 2022-02-28 PROCEDURE — 81003 URINALYSIS AUTO W/O SCOPE: CPT | Performed by: FAMILY MEDICINE

## 2022-02-28 PROCEDURE — 99213 OFFICE O/P EST LOW 20 MIN: CPT | Performed by: FAMILY MEDICINE

## 2022-02-28 PROCEDURE — 86140 C-REACTIVE PROTEIN: CPT | Performed by: FAMILY MEDICINE

## 2022-02-28 PROCEDURE — 85027 COMPLETE CBC AUTOMATED: CPT | Performed by: FAMILY MEDICINE

## 2022-02-28 PROCEDURE — 85652 RBC SED RATE AUTOMATED: CPT | Performed by: FAMILY MEDICINE

## 2022-02-28 PROCEDURE — 36415 COLL VENOUS BLD VENIPUNCTURE: CPT | Performed by: FAMILY MEDICINE

## 2022-02-28 RX ORDER — SACCHAROMYCES BOULARDII 250 MG
250 CAPSULE ORAL
COMMUNITY
End: 2022-02-28

## 2022-02-28 NOTE — PROGRESS NOTES
Assessment/ Plan     1. Perineal pain  Garrett is having some mild perineal pain of unclear etiology.  We discussed the differential including prostatitis.  He has been afebrile and only systemic symptom has been some fatigue.  His urinalysis, CBC, and sed rate are all completely normal which is reassuring.  He has a history of C. difficile and so would only use antibiotics if absolutely necessary.  We will have him start with some conservative therapy including ibuprofen and warm baths to see if that helps.  Follow-up with primary care physician if not improving or worsening symptoms such as fever.  - UA Macro with Reflex to Micro and Culture - lab collect; Future  - CBC with platelets; Future  - CRP inflammation; Future  - ESR: Erythrocyte sedimentation rate; Future  - CBC with platelets  - CRP inflammation  - ESR: Erythrocyte sedimentation rate  - UA Macro with Reflex to Micro and Culture - lab collect      Subjective:      Adam D Macewen is a 41 year old male who presents for evaluation of groin pain.  Patient was in to see his primary care physician a couple of weeks ago for a lump in his groin area.  He had an ultrasound of this which just showed a lipoma.  He states he was feeling pretty anxious about this and that is when he felt like symptoms started.  He just felt discomfort in the whole pelvic area.  This was worse with sitting.  Once he got the results, things felt better for a few days and then symptoms returned over this weekend.  He has had no dysuria, urgency, frequency, or hematuria.  He has not had any fevers or malaise.  He thinks he has been a little bit more fatigued.  He does not feel overall more anxious than typical for him.  He is never had prostatitis.  He has not had constipation.    Relevant past medical, family, surgical, and social history reviewed with patient, unless noted in HPI, not pertinent for this visit.  Medications were discussed and reconciled.   Review of Systems   A 12 point  "comprehensive review of systems was negative except as noted.      No current outpatient medications on file.         Objective:     BP (!) 140/71   Pulse 65   Resp 16   Ht 1.956 m (6' 5\")   Wt 110 kg (242 lb 6.4 oz)   BMI 28.74 kg/m      Body mass index is 28.74 kg/m .       General appearance: alert, appears stated age and cooperative   exam: Normal circumcised penis, maybe just a little bit of tenderness proximal left testicle.  Prostate exam: Not boggy or overall tender    Recent Results (from the past 168 hour(s))   CBC with platelets   Result Value Ref Range    WBC Count 5.4 4.0 - 11.0 10e3/uL    RBC Count 4.78 4.40 - 5.90 10e6/uL    Hemoglobin 14.0 13.3 - 17.7 g/dL    Hematocrit 42.6 40.0 - 53.0 %    MCV 89 78 - 100 fL    MCH 29.3 26.5 - 33.0 pg    MCHC 32.9 31.5 - 36.5 g/dL    RDW 11.8 10.0 - 15.0 %    Platelet Count 272 150 - 450 10e3/uL   ESR: Erythrocyte sedimentation rate   Result Value Ref Range    Erythrocyte Sedimentation Rate 4 0 - 15 mm/hr   UA Macro with Reflex to Micro and Culture - lab collect    Specimen: Urine, Clean Catch   Result Value Ref Range    Color Urine Yellow Colorless, Straw, Light Yellow, Yellow    Appearance Urine Clear Clear    Glucose Urine Negative Negative mg/dL    Bilirubin Urine Negative Negative    Ketones Urine Negative Negative mg/dL    Specific Gravity Urine 1.015 1.005 - 1.030    Blood Urine Negative Negative    pH Urine 5.5 5.0 - 8.0    Protein Albumin Urine Negative Negative mg/dL    Urobilinogen Urine 0.2 0.2, 1.0 E.U./dL    Nitrite Urine Negative Negative    Leukocyte Esterase Urine Negative Negative          This note has been dictated using voice recognition software. Any grammatical or context distortions are unintentional and inherent to the software  Answers for HPI/ROS submitted by the patient on 2/28/2022  How many servings of fruits and vegetables do you eat daily?: 4 or more  On average, how many sweetened beverages do you drink each day (Examples: " soda, juice, sweet tea, etc.  Do NOT count diet or artificially sweetened beverages)?: 0  How many minutes a day do you exercise enough to make your heart beat faster?: 10 to 19  How many days a week do you exercise enough to make your heart beat faster?: 3 or less  How many days per week do you miss taking your medication?: 0  What is the reason for your visit today?: Soreness in groin area behind testicles  When did your symptoms begin?: 1-2 weeks ago  How would you describe these symptoms?: Mild  Are your symptoms:: Improving  Have you had these symptoms before?: No  Is there anything that makes you feel worse?: Sitting for long periods, pressure of needing to have a bowel movement  Is there anything that makes you feel better?: Lying down

## 2022-03-16 ENCOUNTER — TRANSFERRED RECORDS (OUTPATIENT)
Dept: HEALTH INFORMATION MANAGEMENT | Facility: CLINIC | Age: 42
End: 2022-03-16
Payer: COMMERCIAL

## 2022-07-23 ENCOUNTER — HEALTH MAINTENANCE LETTER (OUTPATIENT)
Age: 42
End: 2022-07-23

## 2022-10-01 ENCOUNTER — HEALTH MAINTENANCE LETTER (OUTPATIENT)
Age: 42
End: 2022-10-01

## 2023-08-06 ENCOUNTER — HEALTH MAINTENANCE LETTER (OUTPATIENT)
Age: 43
End: 2023-08-06

## 2024-01-30 ENCOUNTER — OFFICE VISIT (OUTPATIENT)
Dept: FAMILY MEDICINE | Facility: CLINIC | Age: 44
End: 2024-01-30
Payer: COMMERCIAL

## 2024-01-30 VITALS
WEIGHT: 242.4 LBS | RESPIRATION RATE: 16 BRPM | DIASTOLIC BLOOD PRESSURE: 85 MMHG | TEMPERATURE: 97.9 F | SYSTOLIC BLOOD PRESSURE: 143 MMHG | OXYGEN SATURATION: 97 % | HEART RATE: 57 BPM | BODY MASS INDEX: 28.62 KG/M2 | HEIGHT: 77 IN

## 2024-01-30 DIAGNOSIS — R79.89 LOW VITAMIN D LEVEL: ICD-10-CM

## 2024-01-30 DIAGNOSIS — Z13.220 SCREENING CHOLESTEROL LEVEL: ICD-10-CM

## 2024-01-30 DIAGNOSIS — Z00.00 ROUTINE PHYSICAL EXAMINATION: Primary | ICD-10-CM

## 2024-01-30 PROCEDURE — 99396 PREV VISIT EST AGE 40-64: CPT | Mod: 25 | Performed by: FAMILY MEDICINE

## 2024-01-30 PROCEDURE — 90686 IIV4 VACC NO PRSV 0.5 ML IM: CPT | Performed by: FAMILY MEDICINE

## 2024-01-30 PROCEDURE — 90471 IMMUNIZATION ADMIN: CPT | Performed by: FAMILY MEDICINE

## 2024-01-30 PROCEDURE — 99213 OFFICE O/P EST LOW 20 MIN: CPT | Mod: 25 | Performed by: FAMILY MEDICINE

## 2024-01-30 RX ORDER — FAMOTIDINE 20 MG
TABLET ORAL
COMMUNITY

## 2024-01-30 RX ORDER — MULTIVITAMIN
1 TABLET ORAL DAILY
COMMUNITY

## 2024-01-30 ASSESSMENT — ENCOUNTER SYMPTOMS
FREQUENCY: 0
DIARRHEA: 0
ABDOMINAL PAIN: 0
HEMATOCHEZIA: 0
DIZZINESS: 0
MYALGIAS: 0
SORE THROAT: 0
CHILLS: 0
NERVOUS/ANXIOUS: 0
CONSTIPATION: 0
HEADACHES: 0
NAUSEA: 0
SHORTNESS OF BREATH: 0
PARESTHESIAS: 0
FEVER: 0
PALPITATIONS: 0
WEAKNESS: 0
HEARTBURN: 0
COUGH: 0
JOINT SWELLING: 0
HEMATURIA: 0
ARTHRALGIAS: 0
DYSURIA: 0
EYE PAIN: 0

## 2024-01-30 NOTE — PATIENT INSTRUCTIONS
Garrett,    It was great to see you  You may set up a fasting laboratory appointment at your convenience.  You may have black coffee or water on that day  You received a flu shot today  You can consider a COVID booster  You will be due for colonoscopy when you turn 44  Increase your physical activity as you are able  Try to work on weight loss as we described.    Limiting carbohydrates can be helpful (pastas, breads, and sweets)  Monitor your blood pressure.  Your goal blood pressure is 120/80 or lower  If you have multiple elevated readings I do recommend follow-up in the clinic to recheck    Saul Dwyer MD      Preventive Health Recommendations  Male Ages 40 to 49    Yearly exam:             See your health care provider every year in order to  o   Review health changes.   o   Discuss preventive care.    o   Review your medicines if your doctor has prescribed any.  You should be tested each year for STDs (sexually transmitted diseases) if you re at risk.   Have a cholesterol test every 5 years.   Have a colonoscopy (test for colon cancer) beginning at age 45.  Ask your provider about other options like a yearly FIT test or Cologuard test every 3 years (stool tests)   After age 45, have a diabetes test (fasting glucose). If you are at risk for diabetes, you should have this test every 3 years.    Talk with your health care provider about whether or not a prostate cancer screening test (PSA) is right for you.    Shots: Get a flu shot each year. Get a tetanus shot every 10 years.     Nutrition:  Eat at least 5 servings of fruits and vegetables daily.   Eat whole-grain bread, whole-wheat pasta and brown rice instead of white grains and rice.   Get adequate Calcium and Vitamin D.     Lifestyle  Exercise for at least 150 minutes a week (30 minutes a day, 5 days a week). This will help you control your weight and prevent disease.   Limit alcohol to one drink per day.   No smoking.   Wear sunscreen to prevent skin  cancer.   See your dentist every six months for an exam and cleaning.

## 2024-01-30 NOTE — PROGRESS NOTES
Preventive Care Visit  [facility]  Saul Dwyer MD, Family Medicine  Jan 30, 2024      SUBJECTIVE:   Garrett is a 43 year old, presenting for the following:  Physical        1/30/2024     4:37 PM   Additional Questions   Roomed by Maria M BAEZ     Garrett is a pleasant 43 year old male who presents to the clinic for a preventive health visit.    He reports he is doing well currently.     In the past he has had an evaluation for neck pain.  He has had a previous abnormal MRI of the cervical spine with a disc bulge noted at C4-C5 and it is protrusion at C5-C6. He responded to conservative therapy at the time. He is doing generally well now.     He has had occasional low back pain but reports he has been doing well recently.    He did have an evaluation for a posterior right thigh lesion and had an ultrasound showing a probable lipoma. This does not cause discomfort and has not changed.        Healthy Habits:     Getting at least 3 servings of Calcium per day:  Yes    Bi-annual eye exam:  Yes    Dental care twice a year:  Yes    Sleep apnea or symptoms of sleep apnea:  None    Diet:  Vegetarian/vegan and Breakfast skipped    Frequency of exercise:  2-3 days/week    Duration of exercise:  15-30 minutes    Taking medications regularly:  Not Applicable    Medication side effects:  Not applicable    Additional concerns today:  No      Today's PHQ-2 Score:       1/30/2024     4:28 PM   PHQ-2 ( 1999 [employer])   Q1: Little interest or pleasure in doing things 0   Q2: Feeling down, depressed or hopeless 0   PHQ-2 Score 0   Q1: Little interest or pleasure in doing things Not at all   Q2: Feeling down, depressed or hopeless Not at all   PHQ-2 Score 0           Social History     Tobacco Use    Smoking status: Never    Smokeless tobacco: Never   Substance Use Topics    Alcohol use: Not on file             1/30/2024     4:28 PM   Alcohol Use   Prescreen: >3 drinks/day or >7 drinks/week? No          No  "data to display                Last PSA: No results found for: \"PSA\"    Reviewed orders with patient. Reviewed health maintenance and updated orders accordingly - Yes  Patient Active Problem List   Diagnosis    Hemangioma Of The Skin    Seborrheic Dermatitis    Concentric Left Ventricular Hypertrophy    Bulge of cervical disc without myelopathy    Low vitamin D level     History reviewed. No pertinent surgical history.    Social History     Tobacco Use    Smoking status: Never    Smokeless tobacco: Never   Substance Use Topics    Alcohol use: Not on file     Family History   Problem Relation Age of Onset    No Known Problems Mother     No Known Problems Father     Heart Disease Maternal Grandfather          Current Outpatient Medications   Medication Sig Dispense Refill    multivitamin w/minerals (MULTI-VITAMIN) tablet Take 1 tablet by mouth daily      Vitamin D, Cholecalciferol, 25 MCG (1000 UT) CAPS        No Known Allergies    Reviewed and updated as needed this visit by clinical staff   Tobacco  Allergies  Meds              Reviewed and updated as needed this visit by Provider                  History reviewed. No pertinent past medical history.   History reviewed. No pertinent surgical history.  Review of Systems   Constitutional:  Negative for chills and fever.   HENT:  Negative for congestion, ear pain, hearing loss and sore throat.    Eyes:  Negative for pain and visual disturbance.   Respiratory:  Negative for cough and shortness of breath.    Cardiovascular:  Negative for chest pain and palpitations.   Gastrointestinal:  Negative for abdominal pain, constipation, diarrhea and nausea.   Genitourinary:  Negative for dysuria, frequency, genital sores, hematuria, penile discharge and urgency.   Musculoskeletal:  Negative for arthralgias, joint swelling and myalgias.   Skin:  Negative for rash.   Neurological:  Negative for dizziness, weakness and headaches.   Psychiatric/Behavioral:  The patient is not " "nervous/anxious.        Review of Systems  Constitutional, neuro, ENT, endocrine, pulmonary, cardiac, gastrointestinal, genitourinary, musculoskeletal, integument and psychiatric systems are negative, except as otherwise noted.    OBJECTIVE:   BP (!) 143/85 (BP Location: Left arm, Patient Position: Sitting, Cuff Size: Adult Regular)   Pulse 57   Temp 97.9  F (36.6  C) (Oral)   Resp 16   Ht 1.956 m (6' 5\")   Wt 110 kg (242 lb 6.4 oz)   SpO2 97%   BMI 28.74 kg/m     Estimated body mass index is 28.74 kg/m  as calculated from the following:    Height as of this encounter: 1.956 m (6' 5\").    Weight as of this encounter: 110 kg (242 lb 6.4 oz).  Physical Exam  GENERAL: alert and no distress  EYES: Eyes grossly normal to inspection, PERRL and conjunctivae and sclerae normal  HENT: ear canals and TM's normal, nose and mouth without ulcers or lesions  NECK: no adenopathy, no asymmetry, masses, or scars  RESP: lungs clear to auscultation - no rales, rhonchi or wheezes  CV: regular rate and rhythm, normal S1 S2, no S3 or S4, no murmur, click or rub, no peripheral edema  ABDOMEN: soft, nontender   (male): normal male genitalia without lesions or urethral discharge, no hernia  MS: no gross musculoskeletal defects noted, no edema  SKIN: no suspicious lesions or rashes  NEURO: Normal strength and tone, mentation intact and speech normal  PSYCH: mentation appears normal, affect normal/bright  LYMPH: no cervical, supraclavicular, axillary, or inguinal adenopathy    Diagnostic Test Results:  Labs reviewed in Epic    ASSESSMENT/PLAN:   Routine physical examination    Recommend increasing physical activity   Encourage improvement in diet  Limit carbohydrates  He will be a candidate for a colonoscopy when he turns 44    Screening cholesterol level  Set up a fasting lab appointment for a cholesterol check    - Glucose; Future  - Lipid panel reflex to direct LDL Fasting; Future    Low vitamin D level  Check a vitamin D " level    - Vitamin D Deficiency; Future    Posterior subcutaneous thigh lesion, right  Reviewed previous ultrasound showing a probable lipoma    Recommend follow-up if noting a change or pain  Refer for excision if there are concerns    Elevated blood pressure    Recommend checking blood pressure at home  Follow-up to recheck    Patient has been advised of split billing requirements and indicates understanding: Yes      Counseling  Reviewed preventive health counseling, as reflected in patient instructions        He reports that he has never smoked. He has never used smokeless tobacco.            Signed Electronically by: Saul Dwyer MD  Answers submitted by the patient for this visit:  Annual Preventive Visit (Submitted on 1/30/2024)  Chief Complaint: Annual Exam:  Blood in stool: No  heartburn: No  peripheral edema: No  mood changes: No  Skin sensation changes: No  impotence: No

## 2024-10-25 ENCOUNTER — LAB (OUTPATIENT)
Dept: LAB | Facility: CLINIC | Age: 44
End: 2024-10-25
Payer: COMMERCIAL

## 2024-10-25 DIAGNOSIS — Z13.220 SCREENING CHOLESTEROL LEVEL: ICD-10-CM

## 2024-10-25 DIAGNOSIS — R79.89 LOW VITAMIN D LEVEL: ICD-10-CM

## 2024-10-25 LAB
CHOLEST SERPL-MCNC: 207 MG/DL
FASTING STATUS PATIENT QL REPORTED: YES
FASTING STATUS PATIENT QL REPORTED: YES
GLUCOSE SERPL-MCNC: 110 MG/DL (ref 70–99)
HDLC SERPL-MCNC: 50 MG/DL
LDLC SERPL CALC-MCNC: 142 MG/DL
NONHDLC SERPL-MCNC: 157 MG/DL
TRIGL SERPL-MCNC: 75 MG/DL
VIT D+METAB SERPL-MCNC: 23 NG/ML (ref 20–50)

## 2024-10-25 PROCEDURE — 82306 VITAMIN D 25 HYDROXY: CPT

## 2024-10-25 PROCEDURE — 82947 ASSAY GLUCOSE BLOOD QUANT: CPT

## 2024-10-25 PROCEDURE — 80061 LIPID PANEL: CPT

## 2024-10-25 PROCEDURE — 36415 COLL VENOUS BLD VENIPUNCTURE: CPT

## 2024-12-23 ENCOUNTER — TRANSFERRED RECORDS (OUTPATIENT)
Dept: HEALTH INFORMATION MANAGEMENT | Facility: CLINIC | Age: 44
End: 2024-12-23
Payer: COMMERCIAL

## 2024-12-31 ENCOUNTER — PATIENT OUTREACH (OUTPATIENT)
Dept: CARE COORDINATION | Facility: CLINIC | Age: 44
End: 2024-12-31
Payer: COMMERCIAL

## 2025-01-03 PROBLEM — D12.6 COLON ADENOMA: Status: ACTIVE | Noted: 2025-01-03

## 2025-01-06 ENCOUNTER — PATIENT OUTREACH (OUTPATIENT)
Dept: GASTROENTEROLOGY | Facility: CLINIC | Age: 45
End: 2025-01-06
Payer: COMMERCIAL

## 2025-01-14 ENCOUNTER — PATIENT OUTREACH (OUTPATIENT)
Dept: CARE COORDINATION | Facility: CLINIC | Age: 45
End: 2025-01-14
Payer: COMMERCIAL

## 2025-03-15 ENCOUNTER — HEALTH MAINTENANCE LETTER (OUTPATIENT)
Age: 45
End: 2025-03-15